# Patient Record
Sex: MALE | Race: OTHER | HISPANIC OR LATINO | URBAN - METROPOLITAN AREA
[De-identification: names, ages, dates, MRNs, and addresses within clinical notes are randomized per-mention and may not be internally consistent; named-entity substitution may affect disease eponyms.]

---

## 2022-09-02 ENCOUNTER — EMERGENCY (EMERGENCY)
Facility: HOSPITAL | Age: 32
LOS: 0 days | Discharge: HOME | End: 2022-09-02
Attending: EMERGENCY MEDICINE
Payer: MEDICAID

## 2022-09-02 VITALS
RESPIRATION RATE: 18 BRPM | TEMPERATURE: 99 F | HEART RATE: 69 BPM | SYSTOLIC BLOOD PRESSURE: 123 MMHG | OXYGEN SATURATION: 98 % | DIASTOLIC BLOOD PRESSURE: 94 MMHG

## 2022-09-02 VITALS — WEIGHT: 220.46 LBS | HEIGHT: 68 IN

## 2022-09-02 DIAGNOSIS — R11.2 NAUSEA WITH VOMITING, UNSPECIFIED: ICD-10-CM

## 2022-09-02 DIAGNOSIS — D72.829 ELEVATED WHITE BLOOD CELL COUNT, UNSPECIFIED: ICD-10-CM

## 2022-09-02 DIAGNOSIS — N20.0 CALCULUS OF KIDNEY: ICD-10-CM

## 2022-09-02 DIAGNOSIS — R10.12 LEFT UPPER QUADRANT PAIN: ICD-10-CM

## 2022-09-02 LAB
ALBUMIN SERPL ELPH-MCNC: 4 G/DL — SIGNIFICANT CHANGE UP (ref 3.3–5)
ALP SERPL-CCNC: 86 U/L — SIGNIFICANT CHANGE UP (ref 40–120)
ALT FLD-CCNC: 34 U/L — SIGNIFICANT CHANGE UP (ref 12–78)
ANION GAP SERPL CALC-SCNC: 6 MMOL/L — SIGNIFICANT CHANGE UP (ref 5–17)
APPEARANCE UR: CLEAR — SIGNIFICANT CHANGE UP
AST SERPL-CCNC: 19 U/L — SIGNIFICANT CHANGE UP (ref 15–37)
BASOPHILS # BLD AUTO: 0.06 K/UL — SIGNIFICANT CHANGE UP (ref 0–0.2)
BASOPHILS NFR BLD AUTO: 0.3 % — SIGNIFICANT CHANGE UP (ref 0–2)
BILIRUB SERPL-MCNC: 0.5 MG/DL — SIGNIFICANT CHANGE UP (ref 0.2–1.2)
BILIRUB UR-MCNC: NEGATIVE — SIGNIFICANT CHANGE UP
BUN SERPL-MCNC: 15 MG/DL — SIGNIFICANT CHANGE UP (ref 7–23)
CALCIUM SERPL-MCNC: 9.2 MG/DL — SIGNIFICANT CHANGE UP (ref 8.5–10.1)
CHLORIDE SERPL-SCNC: 109 MMOL/L — HIGH (ref 96–108)
CO2 SERPL-SCNC: 26 MMOL/L — SIGNIFICANT CHANGE UP (ref 22–31)
COLOR SPEC: YELLOW — SIGNIFICANT CHANGE UP
CREAT SERPL-MCNC: 1.24 MG/DL — SIGNIFICANT CHANGE UP (ref 0.5–1.3)
DIFF PNL FLD: ABNORMAL
EGFR: 81 ML/MIN/1.73M2 — SIGNIFICANT CHANGE UP
EOSINOPHIL # BLD AUTO: 0.21 K/UL — SIGNIFICANT CHANGE UP (ref 0–0.5)
EOSINOPHIL NFR BLD AUTO: 1.2 % — SIGNIFICANT CHANGE UP (ref 0–6)
GLUCOSE SERPL-MCNC: 126 MG/DL — HIGH (ref 70–99)
GLUCOSE UR QL: NEGATIVE — SIGNIFICANT CHANGE UP
HCT VFR BLD CALC: 45 % — SIGNIFICANT CHANGE UP (ref 39–50)
HGB BLD-MCNC: 15.6 G/DL — SIGNIFICANT CHANGE UP (ref 13–17)
IMM GRANULOCYTES NFR BLD AUTO: 0.5 % — SIGNIFICANT CHANGE UP (ref 0–1.5)
KETONES UR-MCNC: NEGATIVE — SIGNIFICANT CHANGE UP
LEUKOCYTE ESTERASE UR-ACNC: NEGATIVE — SIGNIFICANT CHANGE UP
LYMPHOCYTES # BLD AUTO: 1.61 K/UL — SIGNIFICANT CHANGE UP (ref 1–3.3)
LYMPHOCYTES # BLD AUTO: 8.9 % — LOW (ref 13–44)
MCHC RBC-ENTMCNC: 31.3 PG — SIGNIFICANT CHANGE UP (ref 27–34)
MCHC RBC-ENTMCNC: 34.7 GM/DL — SIGNIFICANT CHANGE UP (ref 32–36)
MCV RBC AUTO: 90.2 FL — SIGNIFICANT CHANGE UP (ref 80–100)
MONOCYTES # BLD AUTO: 0.74 K/UL — SIGNIFICANT CHANGE UP (ref 0–0.9)
MONOCYTES NFR BLD AUTO: 4.1 % — SIGNIFICANT CHANGE UP (ref 2–14)
NEUTROPHILS # BLD AUTO: 15.37 K/UL — HIGH (ref 1.8–7.4)
NEUTROPHILS NFR BLD AUTO: 85 % — HIGH (ref 43–77)
NITRITE UR-MCNC: NEGATIVE — SIGNIFICANT CHANGE UP
PH UR: 5 — SIGNIFICANT CHANGE UP (ref 5–8)
PLATELET # BLD AUTO: 213 K/UL — SIGNIFICANT CHANGE UP (ref 150–400)
POTASSIUM SERPL-MCNC: 3.8 MMOL/L — SIGNIFICANT CHANGE UP (ref 3.5–5.3)
POTASSIUM SERPL-SCNC: 3.8 MMOL/L — SIGNIFICANT CHANGE UP (ref 3.5–5.3)
PROT SERPL-MCNC: 7.5 GM/DL — SIGNIFICANT CHANGE UP (ref 6–8.3)
PROT UR-MCNC: 15
RBC # BLD: 4.99 M/UL — SIGNIFICANT CHANGE UP (ref 4.2–5.8)
RBC # FLD: 12.2 % — SIGNIFICANT CHANGE UP (ref 10.3–14.5)
SODIUM SERPL-SCNC: 141 MMOL/L — SIGNIFICANT CHANGE UP (ref 135–145)
SP GR SPEC: 1.03 — HIGH (ref 1.01–1.02)
UROBILINOGEN FLD QL: NEGATIVE — SIGNIFICANT CHANGE UP
WBC # BLD: 18.08 K/UL — HIGH (ref 3.8–10.5)
WBC # FLD AUTO: 18.08 K/UL — HIGH (ref 3.8–10.5)

## 2022-09-02 PROCEDURE — 87086 URINE CULTURE/COLONY COUNT: CPT

## 2022-09-02 PROCEDURE — 96374 THER/PROPH/DIAG INJ IV PUSH: CPT

## 2022-09-02 PROCEDURE — 85025 COMPLETE CBC W/AUTO DIFF WBC: CPT

## 2022-09-02 PROCEDURE — 99284 EMERGENCY DEPT VISIT MOD MDM: CPT | Mod: 25

## 2022-09-02 PROCEDURE — 36415 COLL VENOUS BLD VENIPUNCTURE: CPT

## 2022-09-02 PROCEDURE — 74176 CT ABD & PELVIS W/O CONTRAST: CPT | Mod: MA

## 2022-09-02 PROCEDURE — 74176 CT ABD & PELVIS W/O CONTRAST: CPT | Mod: 26,MA

## 2022-09-02 PROCEDURE — 96375 TX/PRO/DX INJ NEW DRUG ADDON: CPT

## 2022-09-02 PROCEDURE — 80053 COMPREHEN METABOLIC PANEL: CPT

## 2022-09-02 PROCEDURE — 81001 URINALYSIS AUTO W/SCOPE: CPT

## 2022-09-02 PROCEDURE — 99285 EMERGENCY DEPT VISIT HI MDM: CPT

## 2022-09-02 RX ORDER — ONDANSETRON 8 MG/1
4 TABLET, FILM COATED ORAL ONCE
Refills: 0 | Status: COMPLETED | OUTPATIENT
Start: 2022-09-02 | End: 2022-09-02

## 2022-09-02 RX ORDER — SODIUM CHLORIDE 9 MG/ML
1000 INJECTION INTRAMUSCULAR; INTRAVENOUS; SUBCUTANEOUS ONCE
Refills: 0 | Status: COMPLETED | OUTPATIENT
Start: 2022-09-02 | End: 2022-09-02

## 2022-09-02 RX ORDER — KETOROLAC TROMETHAMINE 30 MG/ML
15 SYRINGE (ML) INJECTION ONCE
Refills: 0 | Status: DISCONTINUED | OUTPATIENT
Start: 2022-09-02 | End: 2022-09-02

## 2022-09-02 RX ORDER — TAMSULOSIN HYDROCHLORIDE 0.4 MG/1
1 CAPSULE ORAL
Qty: 10 | Refills: 0
Start: 2022-09-02 | End: 2022-09-11

## 2022-09-02 RX ORDER — ACETAMINOPHEN 500 MG
1000 TABLET ORAL ONCE
Refills: 0 | Status: COMPLETED | OUTPATIENT
Start: 2022-09-02 | End: 2022-09-02

## 2022-09-02 RX ORDER — OXYCODONE HYDROCHLORIDE 5 MG/1
1 TABLET ORAL
Qty: 16 | Refills: 0
Start: 2022-09-02 | End: 2022-09-05

## 2022-09-02 RX ADMIN — SODIUM CHLORIDE 2000 MILLILITER(S): 9 INJECTION INTRAMUSCULAR; INTRAVENOUS; SUBCUTANEOUS at 19:22

## 2022-09-02 RX ADMIN — ONDANSETRON 4 MILLIGRAM(S): 8 TABLET, FILM COATED ORAL at 19:22

## 2022-09-02 RX ADMIN — Medication 400 MILLIGRAM(S): at 19:59

## 2022-09-02 RX ADMIN — Medication 15 MILLIGRAM(S): at 19:21

## 2022-09-02 NOTE — ED STATDOCS - CLINICAL SUMMARY MEDICAL DECISION MAKING FREE TEXT BOX
exam and story consistent with kidney stone. pt uncomfortable appearing. provide medications, fluids and reassess exam and story consistent with kidney stone. pt uncomfortable appearing. Will provide medications, fluids and reassess

## 2022-09-02 NOTE — ED STATDOCS - CARE PROVIDER_API CALL
Rusty Dsouza)  Urology  [address], [address] [place] [address]  Phone: [phone]  Fax: [phone]  Follow Up Time:

## 2022-09-02 NOTE — ED STATDOCS - NS ED ROS FT
Constitutional: nad, well appearing  HEENT:  no nasal congestion, eye drainage or ear pain.    CVS:  no cp  Resp:  No sob, no cough  GI:  +flank pain, +n/v  :  no dysuria  MSK: no joint pain or limited ROM  Skin: no rash  Neuro: no change in mental status or level of consciousness  Heme/lymph: no bleeding

## 2022-09-02 NOTE — ED STATDOCS - PHYSICAL EXAMINATION
Constitutional: NAD, well appearing  HEENT: no rhinorrhea, PERRL, no oropharyngeal erythema or exudates, midline uvula.  TMs clear.  CVS:  RRR, no m/r/g  Resp:  CTAB  GI: soft, ntnd, no cvat  MSK:  no restriction to rom, full ROM to all extremities  Neuro:  A&Ox3, 5/5 strength to all extremities,  SILT to all extremities  Skin: no rash  psych: clear thought content  Heme/lymph:  No LAD

## 2022-09-02 NOTE — ED STATDOCS - PATIENT PORTAL LINK FT
You can access the FollowMyHealth Patient Portal offered by [facility] by registering at the following website: [email or web address]. By joining [facility]’s FollowMyHealth portal, you will also be able to view your health information using other applications (apps) compatible with our system.

## 2022-09-02 NOTE — ED STATDOCS - PROGRESS NOTE DETAILS
Patient seen and evaluated, ED attending note and orders reviewed, will continue with patient follow up and care -Tammie Mckinley PA-C CT with Mildly obstructing left UVJ stone. Additional left renal lower pole stone. labs with leukocytosis, likely reactive, UA with blood, no signs of infection, pt feeling better after meds, will d/c home with uro f/u, return precautions given including worsening pain and fevers, pt agreeable to d/c and plan of care   [id number]  Tammie Mckinley PA-C

## 2022-09-02 NOTE — ED STATDOCS - OBJECTIVE STATEMENT
31 year old male presents to the ED c/o acute onset of left flank pain after going to bathroom this afternoon. Pain is unremitting with associated nausea and one episode of vomiting. No dysuria or hematuria. No prior abd surgeries, diarrhea. No other injuries or complaints.

## 2022-09-02 NOTE — ED STATDOCS - NSFOLLOWUPINSTRUCTIONS_ED_ALL_ED_FT
Cálculos renales    LO QUE NECESITA SABER:    ¿Qué son los cálculos renales?Los cálculos renales se marjan en el sistema urinario cuando el agua y los residuos de la orina no están tiffanie balanceados. Cuando esto sucede, ciertos tipos de dani de desecho se separan de la orina. Los dani se acumulan y marjan piedras en los riñones. Los cálculos renales pueden estar compuestos de ácido úrico, calcio, fosfato o dani de oxalato. Podría tener más de un cálculo.  Cálculos renales         ¿Qué aumenta mi riesgo de cálculos renales?  •No ying suficientes líquidos (especialmente agua) todos los días      •Tener infecciones frecuentes del tracto urinario      •Demasiada cantidad de ciertos alimentos, hans carne, sal, nueces y chocolate      •Obesidad      •Ciertos medicamentos, hans diuréticos, esteroides y antiácidos      •Antecedentes familiares de cálculos renales      •Nacer con un trastorno renal o intestinal      ¿Cuáles son los signos y síntomas de los cálculos renales?  •Dolor en la parte media de la espalda que se mueve a través de un costado o que podría propagarse a la mikel      •Náuseas y vómitos      •Necesidad de orinar con frecuencia, sensación de ardor al orinar u orina color dg o mccullough      •Sensibilidad en la parte inferior de la espalda, en el costado o en el estómago      ¿Cómo se diagnostican los cálculos renales?El médico va a hacerle preguntas sobre blanco lyndon y alimentos habituales. El médico podría derivarlo a un urólogo. Es posible que usted necesite exámenes para determinar el tipo de cálculos renales que tiene. Los exámenes pueden mostrar el tamaño de los cálculos y en dónde se encuentran en el sistema urinario. Usted podría necesitar más de violette de los siguientes:  •Análisis de orinapodrían mostrar si usted tiene zach en la orina. También podrían mostrar altas cantidades de la sustancia que forma los cálculos renales, hans el ácido úrico.      •Los análisis de sangremuestran lo tiffanie que funcionan mariam riñones. También podrían utilizarse para revisar los niveles de calcio o de ácido úrico en la zach.      •Monique radiografía o ultrasonidose pueden ying de mariam riñones, vejiga y uréteres. Es posible que le administren un líquido de contraste antes de la radiografía para que se vean con mayor claridad en las imágenes. Usted podría necesitar que le realicen más de monique radiografía. Dígale al médico si usted alguna vez ha tenido monique reacción alérgica al líquido de contraste.      ¿Cómo se tratan los cálculos renales?  •AINEcomo el ibuprofeno, ayudan a disminuir la inflamación, el dolor y la fiebre. Sara medicamento está disponible con o sin monique receta médica. Los CHEO pueden causar sangrado estomacal o problemas renales en ciertas personas. Si usted deangelo un medicamento anticoagulante, siempre pregúntele a blanco médico si los CHEO son seguros para usted. Siempre diane la etiqueta de sara medicamento y siga las instrucciones.      •Acetaminofénalivia el dolor y baja la fiebre. Está disponible sin receta médica. Pregunte la cantidad y la frecuencia con que debe tomarlos. Siga las indicaciones. Diane las etiquetas de todos los demás medicamentos que esté usando para saber si también contienen acetaminofén, o pregunte a blanco médico o farmacéutico. El acetaminofén puede causar daño en el hígado cuando no se deangelo de forma correcta.      •Puede administrarsepodrían administrarse. Pregunte al médico cómo debe ying sara medicamento de forma martinez. Algunos medicamentos recetados para el dolor contienen acetaminofén. No tome otros medicamentos que contengan acetaminofén sin consultarlo con blanco médico. Demasiado acetaminofeno puede causar daño al hígado. Los medicamentos recetados para el dolor podrían causar estreñimiento. Pregunte a blanco médico hans prevenir o tratar estreñimiento.      •Los medicamentospara balancear el nivel de los electrolitos.      •Un procedimiento o monique cirugíapara remover los cálculos renales si no se eliminan por sí solos. El tratamiento dependerá del tamaño y ubicación de los cálculos renales.      ¿Qué puedo hacer para controlar mis cálculos?  •Ingiera más líquidos.Es probable que blanco médico le indique que tome al menos 8 a 12 vasos (de ocho onzas) de líquidos al día. [place] ayuda a deshacerse de los cálculos renales cuando usted orina. El agua es el mejor líquido para ying.      •Cuele la orina cada vez que use el baño.Orine por medio de un colador o un pedazo de rizwana quinn para así recolectar los cálculos. Lleve los cálculos donde blanco médico para que pueda enviarlos al laboratorio y realizarles exámenes. [place] ayudará a blanco médico a planear el mejor tratamiento para usted.  Buscar piedras en el filtro           •Pregunte si debe evitar algún alimento.Es posible que necesite limitar el oxalato. El oxalato es monique sustancia química que se encuentra en algunos alimentos de origen vegetal. El tipo de cálculo renal más común se compone de dani que contienen calcio y oxalato. Blanco médico o dietista podría recomendar que limite el consumo de oxalato si sufre de sara tipo de cálculo renal frecuentemente. Es probable que usted tenga que limitar la cantidad de sodio (sal) o proteínas que usted come. Pida más información sobre los mejores alimentos para usted.  Alimentos con alto contenido de oxalatos           •Manténgase físicamente activo según las instrucciones.Mariam cálculos pueden pasar con más facilidad si usted permanece activo. La actividad física también puede ayudarle a controlar el peso. Pregunte sobre cuáles son las mejores actividades para usted.  Chirag hispana caminando hans ejercicio           ¿Cuándo randy buscar atención inmediata?  •Tiene vómitos y no se alivian con medicamentos.          ¿Cuándo randy llamar a mi médico?  •Tiene fiebre.      •Tiene dificultad para orinar.      •Usted orina con zach.      •Usted tiene dolor intenso.      •Tiene alguna pregunta o inquietud acerca de blanco condición o cuidado.      ACUERDOS SOBRE BLANCO CUIDADO:    Usted tiene el derecho de ayudar a planear blanco cuidado. Aprenda todo lo que pueda sobre blanco condición y hans darle tratamiento. Discuta mariam opciones de tratamiento con maraim médicos para decidir el cuidado que usted desea recibir. Usted siempre tiene el derecho de rechazar el tratamiento.

## 2022-09-02 NOTE — ED STATDOCS - NS ED ATTENDING STATEMENT MOD
This was a shared visit with the CHARLENE. I reviewed and verified the documentation and independently performed the documented:

## 2022-09-03 LAB
CULTURE RESULTS: SIGNIFICANT CHANGE UP
SPECIMEN SOURCE: SIGNIFICANT CHANGE UP